# Patient Record
Sex: FEMALE | Race: WHITE | NOT HISPANIC OR LATINO | URBAN - METROPOLITAN AREA
[De-identification: names, ages, dates, MRNs, and addresses within clinical notes are randomized per-mention and may not be internally consistent; named-entity substitution may affect disease eponyms.]

---

## 2018-05-16 ENCOUNTER — APPOINTMENT (RX ONLY)
Dept: URBAN - METROPOLITAN AREA CLINIC 80 | Facility: CLINIC | Age: 63
Setting detail: DERMATOLOGY
End: 2018-05-16

## 2018-05-16 DIAGNOSIS — L81.4 OTHER MELANIN HYPERPIGMENTATION: ICD-10-CM

## 2018-05-16 DIAGNOSIS — Z85.828 PERSONAL HISTORY OF OTHER MALIGNANT NEOPLASM OF SKIN: ICD-10-CM

## 2018-05-16 DIAGNOSIS — D22 MELANOCYTIC NEVI: ICD-10-CM

## 2018-05-16 DIAGNOSIS — L82.1 OTHER SEBORRHEIC KERATOSIS: ICD-10-CM

## 2018-05-16 DIAGNOSIS — D18.0 HEMANGIOMA: ICD-10-CM

## 2018-05-16 PROBLEM — D18.01 HEMANGIOMA OF SKIN AND SUBCUTANEOUS TISSUE: Status: ACTIVE | Noted: 2018-05-16

## 2018-05-16 PROBLEM — D22.5 MELANOCYTIC NEVI OF TRUNK: Status: ACTIVE | Noted: 2018-05-16

## 2018-05-16 PROBLEM — L57.0 ACTINIC KERATOSIS: Status: ACTIVE | Noted: 2018-05-16

## 2018-05-16 PROBLEM — D48.5 NEOPLASM OF UNCERTAIN BEHAVIOR OF SKIN: Status: ACTIVE | Noted: 2018-05-16

## 2018-05-16 PROCEDURE — ? BENIGN DESTRUCTION COSMETIC

## 2018-05-16 PROCEDURE — ? BIOPSY BY SHAVE METHOD

## 2018-05-16 PROCEDURE — ? COUNSELING

## 2018-05-16 PROCEDURE — 11100: CPT

## 2018-05-16 PROCEDURE — 99203 OFFICE O/P NEW LOW 30 MIN: CPT | Mod: 25

## 2018-05-16 PROCEDURE — ? SUNSCREEN RECOMMENDATIONS

## 2018-05-16 ASSESSMENT — LOCATION SIMPLE DESCRIPTION DERM
LOCATION SIMPLE: CHEST
LOCATION SIMPLE: SCALP
LOCATION SIMPLE: ABDOMEN
LOCATION SIMPLE: LEFT CHEEK
LOCATION SIMPLE: LEFT FOREHEAD

## 2018-05-16 ASSESSMENT — LOCATION DETAILED DESCRIPTION DERM
LOCATION DETAILED: RIGHT LATERAL ABDOMEN
LOCATION DETAILED: RIGHT LATERAL SUPERIOR CHEST
LOCATION DETAILED: LEFT INFERIOR FOREHEAD
LOCATION DETAILED: LEFT LATERAL SUPERIOR CHEST
LOCATION DETAILED: LEFT INFERIOR CENTRAL MALAR CHEEK
LOCATION DETAILED: LEFT CENTRAL FRONTAL SCALP

## 2018-05-16 ASSESSMENT — LOCATION ZONE DERM
LOCATION ZONE: SCALP
LOCATION ZONE: FACE
LOCATION ZONE: TRUNK

## 2018-05-16 NOTE — HPI: NON-MELANOMA SKIN CANCER F/U (HISTORY OF NMSC)
How Many Skin Cancers Have You Had?: more than one
What Is The Reason For Today's Visit?: Surveillance against skin cancer recurrences
When Was Your Last Cancer Diagnosed?: 5/2016

## 2018-05-16 NOTE — PROCEDURE: BENIGN DESTRUCTION COSMETIC
Anesthesia Type: 1% lidocaine with epinephrine
Detail Level: Detailed
Post-Care Instructions: I reviewed with the patient in detail post-care instructions. Patient is to wear sunprotection, and avoid picking at any of the treated lesions. Pt may apply Vaseline to crusted or scabbing areas.
Consent: The patient's consent was obtained including but not limited to risks of crusting, scabbing, blistering, scarring, darker or lighter pigmentary change, recurrence, incomplete removal and infection.
Price (Use Numbers Only, No Special Characters Or $): 105
Anesthesia Volume In Cc: 0.5